# Patient Record
Sex: FEMALE | Race: WHITE | Employment: FULL TIME | URBAN - METROPOLITAN AREA
[De-identification: names, ages, dates, MRNs, and addresses within clinical notes are randomized per-mention and may not be internally consistent; named-entity substitution may affect disease eponyms.]

---

## 2020-03-26 ENCOUNTER — HOSPITAL ENCOUNTER (INPATIENT)
Age: 35
LOS: 1 days | Discharge: HOME | DRG: 392 | End: 2020-03-27
Attending: EMERGENCY MEDICINE | Admitting: INTERNAL MEDICINE
Payer: SELF-PAY

## 2020-03-26 ENCOUNTER — APPOINTMENT (OUTPATIENT)
Dept: GENERAL RADIOLOGY | Age: 35
DRG: 392 | End: 2020-03-26
Attending: EMERGENCY MEDICINE
Payer: SELF-PAY

## 2020-03-26 DIAGNOSIS — E86.0 DEHYDRATION: ICD-10-CM

## 2020-03-26 DIAGNOSIS — D72.829 LEUKOCYTOSIS, UNSPECIFIED TYPE: ICD-10-CM

## 2020-03-26 DIAGNOSIS — N17.9 ACUTE RENAL FAILURE, UNSPECIFIED ACUTE RENAL FAILURE TYPE (HCC): Primary | ICD-10-CM

## 2020-03-26 DIAGNOSIS — R10.84 ABDOMINAL PAIN, GENERALIZED: ICD-10-CM

## 2020-03-26 PROBLEM — E87.1 HYPONATREMIA: Status: ACTIVE | Noted: 2020-03-26

## 2020-03-26 PROBLEM — E87.6 HYPOKALEMIA: Status: ACTIVE | Noted: 2020-03-26

## 2020-03-26 PROBLEM — Z87.59 HISTORY OF POSTPARTUM DEPRESSION: Status: ACTIVE | Noted: 2018-07-26

## 2020-03-26 PROBLEM — D75.839 THROMBOCYTOSIS: Status: ACTIVE | Noted: 2020-03-26

## 2020-03-26 PROBLEM — R11.2 NAUSEA & VOMITING: Status: ACTIVE | Noted: 2020-03-26

## 2020-03-26 PROBLEM — Z86.59 HISTORY OF POSTPARTUM DEPRESSION: Status: ACTIVE | Noted: 2018-07-26

## 2020-03-26 PROBLEM — I10 BENIGN HYPERTENSION: Status: ACTIVE | Noted: 2019-08-12

## 2020-03-26 PROBLEM — E66.9 OBESITY (BMI 30-39.9): Status: ACTIVE | Noted: 2019-09-11

## 2020-03-26 LAB
ANION GAP SERPL CALC-SCNC: 8 MMOL/L (ref 7–16)
BACTERIA URNS QL MICRO: 0 /HPF
BASOPHILS # BLD: 0.1 K/UL (ref 0–0.2)
BASOPHILS NFR BLD: 0 % (ref 0–2)
BUN SERPL-MCNC: 29 MG/DL (ref 6–23)
CALCIUM SERPL-MCNC: 9.1 MG/DL (ref 8.3–10.4)
CASTS URNS QL MICRO: NORMAL /LPF
CHLORIDE SERPL-SCNC: 95 MMOL/L (ref 98–107)
CO2 SERPL-SCNC: 27 MMOL/L (ref 21–32)
CREAT SERPL-MCNC: 1.61 MG/DL (ref 0.6–1)
CRYSTALS URNS QL MICRO: 0 /LPF
DIFFERENTIAL METHOD BLD: ABNORMAL
EOSINOPHIL # BLD: 0.2 K/UL (ref 0–0.8)
EOSINOPHIL NFR BLD: 1 % (ref 0.5–7.8)
EPI CELLS #/AREA URNS HPF: NORMAL /HPF
ERYTHROCYTE [DISTWIDTH] IN BLOOD BY AUTOMATED COUNT: 14.3 % (ref 11.9–14.6)
GLUCOSE SERPL-MCNC: 145 MG/DL (ref 65–100)
HCT VFR BLD AUTO: 41.8 % (ref 35.8–46.3)
HGB BLD-MCNC: 13.6 G/DL (ref 11.7–15.4)
IMM GRANULOCYTES # BLD AUTO: 0.4 K/UL (ref 0–0.5)
IMM GRANULOCYTES NFR BLD AUTO: 1 % (ref 0–5)
LYMPHOCYTES # BLD: 1.1 K/UL (ref 0.5–4.6)
LYMPHOCYTES NFR BLD: 4 % (ref 13–44)
MCH RBC QN AUTO: 26.9 PG (ref 26.1–32.9)
MCHC RBC AUTO-ENTMCNC: 32.5 G/DL (ref 31.4–35)
MCV RBC AUTO: 82.6 FL (ref 79.6–97.8)
MONOCYTES # BLD: 1.7 K/UL (ref 0.1–1.3)
MONOCYTES NFR BLD: 6 % (ref 4–12)
MUCOUS THREADS URNS QL MICRO: 0 /LPF
NEUTS SEG # BLD: 23.8 K/UL (ref 1.7–8.2)
NEUTS SEG NFR BLD: 87 % (ref 43–78)
NRBC # BLD: 0 K/UL (ref 0–0.2)
OTHER OBSERVATIONS,UCOM: NORMAL
PLATELET # BLD AUTO: 607 K/UL (ref 150–450)
PMV BLD AUTO: 9.3 FL (ref 9.4–12.3)
POTASSIUM SERPL-SCNC: 3.1 MMOL/L (ref 3.5–5.1)
RBC # BLD AUTO: 5.06 M/UL (ref 4.05–5.2)
RBC #/AREA URNS HPF: NORMAL /HPF
SODIUM SERPL-SCNC: 130 MMOL/L (ref 136–145)
WBC # BLD AUTO: 27.2 K/UL (ref 4.3–11.1)
WBC URNS QL MICRO: NORMAL /HPF

## 2020-03-26 PROCEDURE — 85025 COMPLETE CBC W/AUTO DIFF WBC: CPT

## 2020-03-26 PROCEDURE — 74011250637 HC RX REV CODE- 250/637: Performed by: EMERGENCY MEDICINE

## 2020-03-26 PROCEDURE — 74011250636 HC RX REV CODE- 250/636: Performed by: INTERNAL MEDICINE

## 2020-03-26 PROCEDURE — 74011250636 HC RX REV CODE- 250/636: Performed by: EMERGENCY MEDICINE

## 2020-03-26 PROCEDURE — 65270000029 HC RM PRIVATE

## 2020-03-26 PROCEDURE — 81015 MICROSCOPIC EXAM OF URINE: CPT

## 2020-03-26 PROCEDURE — 74011250637 HC RX REV CODE- 250/637: Performed by: INTERNAL MEDICINE

## 2020-03-26 PROCEDURE — 74022 RADEX COMPL AQT ABD SERIES: CPT

## 2020-03-26 PROCEDURE — 96375 TX/PRO/DX INJ NEW DRUG ADDON: CPT

## 2020-03-26 PROCEDURE — 96374 THER/PROPH/DIAG INJ IV PUSH: CPT

## 2020-03-26 PROCEDURE — 99284 EMERGENCY DEPT VISIT MOD MDM: CPT

## 2020-03-26 PROCEDURE — 81003 URINALYSIS AUTO W/O SCOPE: CPT

## 2020-03-26 PROCEDURE — 80048 BASIC METABOLIC PNL TOTAL CA: CPT

## 2020-03-26 RX ORDER — CIPROFLOXACIN 2 MG/ML
400 INJECTION, SOLUTION INTRAVENOUS
Status: COMPLETED | OUTPATIENT
Start: 2020-03-26 | End: 2020-03-26

## 2020-03-26 RX ORDER — POTASSIUM CHLORIDE 20 MEQ/1
40 TABLET, EXTENDED RELEASE ORAL
Status: COMPLETED | OUTPATIENT
Start: 2020-03-26 | End: 2020-03-26

## 2020-03-26 RX ORDER — CIPROFLOXACIN 2 MG/ML
400 INJECTION, SOLUTION INTRAVENOUS EVERY 12 HOURS
Status: DISCONTINUED | OUTPATIENT
Start: 2020-03-27 | End: 2020-03-27 | Stop reason: HOSPADM

## 2020-03-26 RX ORDER — ONDANSETRON 2 MG/ML
INJECTION INTRAMUSCULAR; INTRAVENOUS
Status: ACTIVE
Start: 2020-03-26 | End: 2020-03-27

## 2020-03-26 RX ORDER — NALOXONE HYDROCHLORIDE 0.4 MG/ML
0.4 INJECTION, SOLUTION INTRAMUSCULAR; INTRAVENOUS; SUBCUTANEOUS AS NEEDED
Status: DISCONTINUED | OUTPATIENT
Start: 2020-03-26 | End: 2020-03-27 | Stop reason: HOSPADM

## 2020-03-26 RX ORDER — METRONIDAZOLE 500 MG/100ML
500 INJECTION, SOLUTION INTRAVENOUS EVERY 12 HOURS
Status: DISCONTINUED | OUTPATIENT
Start: 2020-03-26 | End: 2020-03-27 | Stop reason: HOSPADM

## 2020-03-26 RX ORDER — ACETAMINOPHEN 325 MG/1
650 TABLET ORAL
Status: DISCONTINUED | OUTPATIENT
Start: 2020-03-26 | End: 2020-03-27 | Stop reason: HOSPADM

## 2020-03-26 RX ORDER — ONDANSETRON 2 MG/ML
4 INJECTION INTRAMUSCULAR; INTRAVENOUS
Status: DISCONTINUED | OUTPATIENT
Start: 2020-03-26 | End: 2020-03-27 | Stop reason: HOSPADM

## 2020-03-26 RX ORDER — SODIUM CHLORIDE 0.9 % (FLUSH) 0.9 %
5-40 SYRINGE (ML) INJECTION AS NEEDED
Status: DISCONTINUED | OUTPATIENT
Start: 2020-03-26 | End: 2020-03-27 | Stop reason: HOSPADM

## 2020-03-26 RX ORDER — SODIUM CHLORIDE 0.9 % (FLUSH) 0.9 %
5-40 SYRINGE (ML) INJECTION EVERY 8 HOURS
Status: DISCONTINUED | OUTPATIENT
Start: 2020-03-26 | End: 2020-03-27 | Stop reason: HOSPADM

## 2020-03-26 RX ORDER — HYDROCODONE BITARTRATE AND ACETAMINOPHEN 5; 325 MG/1; MG/1
1 TABLET ORAL
Status: DISCONTINUED | OUTPATIENT
Start: 2020-03-26 | End: 2020-03-27

## 2020-03-26 RX ORDER — SODIUM CHLORIDE 9 MG/ML
125 INJECTION, SOLUTION INTRAVENOUS CONTINUOUS
Status: DISPENSED | OUTPATIENT
Start: 2020-03-26 | End: 2020-03-26

## 2020-03-26 RX ORDER — POTASSIUM CHLORIDE 14.9 MG/ML
10 INJECTION INTRAVENOUS
Status: DISPENSED | OUTPATIENT
Start: 2020-03-26 | End: 2020-03-26

## 2020-03-26 RX ORDER — NIFEDIPINE 60 MG/1
60 TABLET, EXTENDED RELEASE ORAL DAILY
COMMUNITY

## 2020-03-26 RX ORDER — ENOXAPARIN SODIUM 100 MG/ML
40 INJECTION SUBCUTANEOUS EVERY 24 HOURS
Status: DISCONTINUED | OUTPATIENT
Start: 2020-03-26 | End: 2020-03-27 | Stop reason: HOSPADM

## 2020-03-26 RX ORDER — ONDANSETRON 2 MG/ML
4 INJECTION INTRAMUSCULAR; INTRAVENOUS
Status: COMPLETED | OUTPATIENT
Start: 2020-03-26 | End: 2020-03-26

## 2020-03-26 RX ORDER — SODIUM CHLORIDE 9 MG/ML
150 INJECTION, SOLUTION INTRAVENOUS CONTINUOUS
Status: DISCONTINUED | OUTPATIENT
Start: 2020-03-26 | End: 2020-03-27

## 2020-03-26 RX ORDER — MORPHINE SULFATE 2 MG/ML
1 INJECTION, SOLUTION INTRAMUSCULAR; INTRAVENOUS
Status: DISCONTINUED | OUTPATIENT
Start: 2020-03-26 | End: 2020-03-27

## 2020-03-26 RX ORDER — METRONIDAZOLE 500 MG/100ML
500 INJECTION, SOLUTION INTRAVENOUS
Status: DISCONTINUED | OUTPATIENT
Start: 2020-03-26 | End: 2020-03-26 | Stop reason: SDUPTHER

## 2020-03-26 RX ADMIN — POTASSIUM CHLORIDE: 2 INJECTION, SOLUTION, CONCENTRATE INTRAVENOUS at 18:36

## 2020-03-26 RX ADMIN — ONDANSETRON 4 MG: 2 INJECTION INTRAMUSCULAR; INTRAVENOUS at 13:43

## 2020-03-26 RX ADMIN — ENOXAPARIN SODIUM 40 MG: 40 INJECTION SUBCUTANEOUS at 21:39

## 2020-03-26 RX ADMIN — SODIUM CHLORIDE 1000 ML: 900 INJECTION, SOLUTION INTRAVENOUS at 14:54

## 2020-03-26 RX ADMIN — ONDANSETRON 4 MG: 2 INJECTION INTRAMUSCULAR; INTRAVENOUS at 17:25

## 2020-03-26 RX ADMIN — METRONIDAZOLE 500 MG: 500 INJECTION, SOLUTION INTRAVENOUS at 16:52

## 2020-03-26 RX ADMIN — SODIUM CHLORIDE 125 ML/HR: 900 INJECTION, SOLUTION INTRAVENOUS at 14:54

## 2020-03-26 RX ADMIN — SODIUM CHLORIDE 1000 ML: 900 INJECTION, SOLUTION INTRAVENOUS at 13:43

## 2020-03-26 RX ADMIN — Medication 10 ML: at 22:00

## 2020-03-26 RX ADMIN — CIPROFLOXACIN 400 MG: 2 INJECTION, SOLUTION INTRAVENOUS at 15:43

## 2020-03-26 RX ADMIN — POTASSIUM CHLORIDE 40 MEQ: 1500 TABLET, EXTENDED RELEASE ORAL at 14:54

## 2020-03-26 RX ADMIN — HYDROCODONE BITARTRATE AND ACETAMINOPHEN 1 TABLET: 5; 325 TABLET ORAL at 19:50

## 2020-03-26 NOTE — ASSESSMENT & PLAN NOTE
Nausea vomiting and diarrhea most likely secondary to the acute gastroenteritis but we cannot completely rule out any infectious origin for now we are going to continue the antibiotics follow her clinically

## 2020-03-26 NOTE — ED PROVIDER NOTES
Patient is a 43-year-old female presenting to emerge department day secondary to nausea vomiting and abdominal discomfort. Patient states that this started on Monday night with vomiting and she has had vomiting daily since. The patient says that she is had one episode of loose stools today but has not noticed any blood in her stools or emesis. She had a significant decrease in her oral intake over the last 4 days secondary to the nausea. She has not taken her Procardia since Monday. The patient states that she is now having abdominal discomfort throughout the abdomen from all the episodes of vomiting. She did have a virtual doctor's appointment this morning and was called in some Zofran but then her stomach started hurting worse and she decided to come here to the ER. Upon arrival her blood pressure was markedly low and the patient appeared pale. Past Medical History:   Diagnosis Date    B12 deficiency, history of 11/6/2012    Fatigue 11/6/2012    Hypertension        History reviewed. No pertinent surgical history.       Family History:   Problem Relation Age of Onset    Hypertension Mother     Cancer Mother         colon    Hypertension Maternal Grandmother     Heart Disease Maternal Grandmother     Hypertension Maternal Grandfather     Diabetes Maternal Grandfather     Stroke Maternal Grandfather     Cancer Maternal Grandfather         bladder       Social History     Socioeconomic History    Marital status:      Spouse name: Not on file    Number of children: Not on file    Years of education: Not on file    Highest education level: Not on file   Occupational History    Not on file   Social Needs    Financial resource strain: Not on file    Food insecurity     Worry: Not on file     Inability: Not on file    Transportation needs     Medical: Not on file     Non-medical: Not on file   Tobacco Use    Smoking status: Never Smoker    Smokeless tobacco: Never Used Substance and Sexual Activity    Alcohol use: Yes     Comment: 1-2 x week    Drug use: Not on file    Sexual activity: Not on file   Lifestyle    Physical activity     Days per week: Not on file     Minutes per session: Not on file    Stress: Not on file   Relationships    Social connections     Talks on phone: Not on file     Gets together: Not on file     Attends Restorationism service: Not on file     Active member of club or organization: Not on file     Attends meetings of clubs or organizations: Not on file     Relationship status: Not on file    Intimate partner violence     Fear of current or ex partner: Not on file     Emotionally abused: Not on file     Physically abused: Not on file     Forced sexual activity: Not on file   Other Topics Concern    Not on file   Social History Narrative    Not on file         ALLERGIES: Patient has no known allergies. Review of Systems   Gastrointestinal: Positive for abdominal pain, diarrhea, nausea and vomiting. Genitourinary: Positive for decreased urine volume. All other systems reviewed and are negative. Vitals:    03/26/20 1309 03/26/20 1425 03/26/20 1444 03/26/20 1445   BP: (!) 77/59 140/78 126/72    Pulse:       Resp:       Temp:       SpO2:    96%   Weight:       Height:                Physical Exam     GENERAL:The patient is overweight, and dehydrated. VITAL SIGNS: Heart rate, blood pressure, respiratory rate reviewed as recorded in  nurse's notes  EYES: Pupils reactive. Extraocular motion intact. No conjunctival redness or drainage. EARS: No external masses or lesions. NOSE: No nasal drainage or epistaxis. MOUTH/THROAT: Pharynx clear; airway patent. Decreased moisture in the mucous membranes  NECK: Supple, no meningeal signs. Trachea midline. No masses or thyromegaly. LUNGS: Breath sounds clear and equal bilaterally no accessory muscle use  CARDIOVASCULAR: Regular rate and rhythm  ABDOMEN: Soft with mild diffuse tenderness.  No palpable masses or organomegaly. No  peritoneal signs. No rigidity. EXTREMITIES: No clubbing or cyanosis. No joint swelling. Normal muscle tone. No  restricted range of motion appreciated. NEUROLOGIC: Sensation is grossly intact. Cranial nerve exam reveals face is  symmetrical, tongue is midline speech is clear. SKIN: No rash or petechiae. Good skin turgor palpated. Pale  PSYCHIATRIC: Alert and oriented. Appropriate behavior and judgment. MDM  Number of Diagnoses or Management Options  Diagnosis management comments: Viral infection, gastroenteritis, viral adenitis, pseudomembranous colitis, inflammatory  bowel disease, infectious diarrhea    Abdominal wall pain,     Constipation, fecal impaction, small bowel obstruction, partial small bowel obstruction,  Ileus    UTI, pyelonephritis, renal colic, ureteral stone     Peptic ulcer disease, esophagitis, GERD    Pancreatitis, pancreatic pseudocyst,    hepatic cirrhosis, GI bleed, esophageal varices, poisoning,    gallbladder disease, cholecystitis, diverticulitis, appendicitis, appendicitis with rupture,    ingestion of foreign material         Amount and/or Complexity of Data Reviewed  Clinical lab tests: ordered and reviewed  Tests in the radiology section of CPT®: ordered and reviewed  Tests in the medicine section of CPT®: reviewed and ordered  Review and summarize past medical records: yes  Independent visualization of images, tracings, or specimens: yes      ED Course as of Mar 26 1533   Thu Mar 26, 2020   1443 IMPRESSION:   1. Nonspecific bowel gas pattern.     2. No free intraperitoneal air. XR ABD ACUTE W 1 V CHEST [KH]   1444 WBC(!): 27.2 [KH]   1444 PLATELET(!): 945 [KH]   1444 ABS. NEUTROPHILS(!): 23.8 [KH]   1444 Potassium(!): 3.1 [KH]   1444 BUN(!): 29 [KH]   1445 Creatinine(!): 1.61 [KH]   1520 I talked the patient about the findings in the emergency department.   Secondary to her significant leukocytosis with abdominal pain and acute renal failure she is agreeable with being admitted to the hospital    [KH]   1533 Case discussed with Dr. Suzanna Wolfe who is agreeable with seeing the patient here in the emergency department plan on admitting her to the hospital.    [facility]      ED Course User Index  [KH] Laura Thacker, DO       Procedures

## 2020-03-26 NOTE — H&P
HOSPITALIST H&P/CONSULT  NAME:  Kirsten Portillo   Age:  29 y.o.  :   1985   MRN:   [id number]  PCP: Isamar Castañeda MD  Consulting MD:  Treatment Team: Primary Nurse: Naomy Guidry; Consulting Provider: Jose Fam MD  HPI:   Patient  is a 29 y.o. female presenting with    HPI  42-year-old  female patient with past medical history significant for hypertension presented to the emergency department with chief complaint of intractable nausea vomiting and diarrhea which started approximately 3 days ago. There is that she ate at a fast food restaurant on Monday after that she started having nausea and vomiting which is progressively getting worse, she also states that from yesterday she also having significant diarrhea it concerned her so she came to the emergency department patient denies any subjective fever chills productive cough she denies any contact with the sick individual, she denies any recent antibiotic use ,patient also complaining of feeling dizzy and lightheaded  Subjective   Past Medical History:   Diagnosis Date    B12 deficiency, history of 2012    Fatigue 2012    Hypertension       History reviewed. No pertinent surgical history. Prior to Admission Medications   Prescriptions Last Dose Informant Patient Reported? Taking? NIFEdipine ER (Procardia XL) 60 mg ER tablet   Yes Yes   Sig: Take 60 mg by mouth daily.       Facility-Administered Medications: None     No Known Allergies   Social History     Tobacco Use    Smoking status: Never Smoker    Smokeless tobacco: Never Used   Substance Use Topics    Alcohol use: Yes     Comment: 1-2 x week      Family History   Problem Relation Age of Onset    Hypertension Mother     Cancer Mother         colon    Hypertension Maternal Grandmother     Heart Disease Maternal Grandmother     Hypertension Maternal Grandfather     Diabetes Maternal Grandfather     Stroke Maternal Grandfather     Cancer Maternal Grandfather         bladder          Objective     Objective:     Visit Vitals  /72   Pulse 93   Temp 98.1 °F (36.7 °C)   Resp 18   Ht 5' 9\" (1.753 m)   Wt 99.8 kg (220 lb)   LMP 2020 (Exact Date)   SpO2 96%   BMI 32.49 kg/m²      Temp (24hrs), Av.1 °F (36.7 °C), Min:98.1 °F (36.7 °C), Max:98.1 °F (36.7 °C)    Oxygen Therapy  O2 Sat (%): 96 % (20 1445)  Pulse via Oximetry: 94 beats per minute (20 1445)  O2 Device: Room air (20 1307)  Review of Systems   Constitution: Negative. HENT: Negative. Eyes: Negative. Cardiovascular: Negative. Endocrine: Negative. Hematologic/Lymphatic: Negative. Skin: Negative. Musculoskeletal: Negative. Gastrointestinal: Positive for abdominal pain, diarrhea, nausea and vomiting. Genitourinary: Negative. Neurological: Negative. Psychiatric/Behavioral: Negative. Physical Exam  Constitutional:       Appearance: She is well-developed. HENT:      Head: Normocephalic and atraumatic. Mouth/Throat:      Mouth: Mucous membranes are dry. Eyes:      Conjunctiva/sclera: Conjunctivae normal.      Pupils: Pupils are equal, round, and reactive to light. Neck:      Musculoskeletal: Normal range of motion and neck supple. Cardiovascular:      Rate and Rhythm: Normal rate and regular rhythm. Heart sounds: Normal heart sounds. Pulmonary:      Effort: Pulmonary effort is normal.      Breath sounds: Normal breath sounds. Abdominal:      General: Bowel sounds are normal.      Palpations: Abdomen is soft. Skin:     General: Skin is warm and dry. Neurological:      Mental Status: She is alert.          Data Review:   Recent Results (from the past 24 hour(s))   CBC WITH AUTOMATED DIFF    Collection Time: 20  1:30 PM   Result Value Ref Range    WBC 27.2 (H) 4.3 - 11.1 K/uL    RBC 5.06 4.05 - 5.2 M/uL    HGB 13.6 11.7 - 15.4 g/dL    HCT 41.8 35.8 - 46.3 %    MCV 82.6 79.6 - 97.8 FL    MCH 26.9 26.1 - 32.9 PG MCHC 32.5 31.4 - 35.0 g/dL    RDW 14.3 11.9 - 14.6 %    PLATELET 270 (H) 353 - 450 K/uL    MPV 9.3 (L) 9.4 - 12.3 FL    ABSOLUTE NRBC 0.00 0.0 - 0.2 K/uL    DF AUTOMATED      NEUTROPHILS 87 (H) 43 - 78 %    LYMPHOCYTES 4 (L) 13 - 44 %    MONOCYTES 6 4.0 - 12.0 %    EOSINOPHILS 1 0.5 - 7.8 %    BASOPHILS 0 0.0 - 2.0 %    IMMATURE GRANULOCYTES 1 0.0 - 5.0 %    ABS. NEUTROPHILS 23.8 (H) 1.7 - 8.2 K/UL    ABS. LYMPHOCYTES 1.1 0.5 - 4.6 K/UL    ABS. MONOCYTES 1.7 (H) 0.1 - 1.3 K/UL    ABS. EOSINOPHILS 0.2 0.0 - 0.8 K/UL    ABS. BASOPHILS 0.1 0.0 - 0.2 K/UL    ABS. IMM. GRANS. 0.4 0.0 - 0.5 K/UL   METABOLIC PANEL, BASIC    Collection Time: 03/26/20  1:30 PM   Result Value Ref Range    Sodium 130 (L) 136 - 145 mmol/L    Potassium 3.1 (L) 3.5 - 5.1 mmol/L    Chloride 95 (L) 98 - 107 mmol/L    CO2 27 21 - 32 mmol/L    Anion gap 8 7 - 16 mmol/L    Glucose 145 (H) 65 - 100 mg/dL    BUN 29 (H) 6 - 23 MG/DL    Creatinine 1.61 (H) 0.6 - 1.0 MG/DL    GFR est AA 47 (L) >60 ml/min/1.73m2    GFR est non-AA 39 (L) >60 ml/min/1.73m2    Calcium 9.1 8.3 - 10.4 MG/DL   URINE MICROSCOPIC    Collection Time: 03/26/20  2:46 PM   Result Value Ref Range    WBC 20-50 0 /hpf    RBC 10-20 0 /hpf    Epithelial cells 5-10 0 /hpf    Bacteria 0 0 /hpf    Casts WBC CAST 0 /lpf    Crystals, urine 0 0 /LPF    Mucus 0 0 /lpf    Other observations RESULTS VERIFIED MANUALLY       Imaging /Procedures /Studies       XR Results (maximum last 3): Results from [place] encounter on 03/26/20   XR ABD ACUTE W 1 V CHEST    Impression IMPRESSION:   1. Nonspecific bowel gas pattern. 2.  No free intraperitoneal air. CT Results (maximum last 3): No results found for this or any previous visit. MRI Results (maximum last 3): No results found for this or any previous visit. Nuclear Medicine Results (maximum last 3): No results found for this or any previous visit. US Results (maximum last 3):   No results found for this or any previous visit. Assessment     Assessment and Plan:      Active Hospital Problems    Diagnosis Date Noted    DECLAN (acute kidney injury) ([facility] [address] 03/26/2020    Hypokalemia 03/26/2020    Hyponatremia 03/26/2020    Leukocytosis 03/26/2020    Nausea & vomiting 03/26/2020    Thrombocytosis ([facility] [address] 03/26/2020    Benign hypertension 08/12/2019     Last Assessment & Plan:   BP at goal and continue current treatment      B12 deficiency, history of 11/06/2012         PLAN  ·    Assessment and plan has been discussed with the patient answered all her questions patient code category category 1 full code     Assessment   [facility]) [facility]  Acute kidney injury secondary to the volume loss from intractable nausea vomiting and diarrhea aggressive IV hydration repeat the BMP    Hypokalemia  Secondary to GI loss started on IV potassium supplementation    Hyponatremia  Hypovolemic hyponatremia, IV hydration follow the BMP    Leukocytosis  Most likely stress-induced, repeat the CBC    Nausea & vomiting  Nausea vomiting and diarrhea most likely secondary to the acute gastroenteritis but we cannot completely rule out any infectious origin for now we are going to continue the antibiotics follow her clinically    Benign hypertension  Of hypertension currently hypotensive hold antihypertensive medication        Code Status:     Anticipated discharge:     Signed By: Paul Rangel MD     March 26, 2020

## 2020-03-26 NOTE — ED NOTES
TRANSFER - OUT REPORT:    Verbal report given to radha pisano on Jasmine Davies  being transferred to [facility] for routine progression of care       Report consisted of patients Situation, Background, Assessment and   Recommendations(SBAR). Information from the following report(s) SBAR was reviewed with the receiving nurse. Lines:   Peripheral IV 03/26/20 Right Antecubital (Active)   Site Assessment Clean, dry, & intact 3/26/2020  1:35 PM   Phlebitis Assessment 0 3/26/2020  1:35 PM   Infiltration Assessment 0 3/26/2020  1:35 PM   Dressing Status Clean, dry, & intact 3/26/2020  1:35 PM        Opportunity for questions and clarification was provided.       Patient transported with:   [employer]

## 2020-03-26 NOTE — ASSESSMENT & PLAN NOTE
Acute kidney injury secondary to the volume loss from intractable nausea vomiting and diarrhea aggressive IV hydration repeat the BMP

## 2020-03-26 NOTE — PROGRESS NOTES
Shift assessment complete. Respirations present. Even and unlabored. No s/s of distress. Zero c/o pain at this time. Call light within reach. Encouraged patient to call for assistance. Patient verbalizes understanding. See Doc Flowsheet for assessment details. Patient resting in bed. Skin intact with normal saline with 10KCL infusing at 150cc per hour. Saline well intact to right antecubital. Abdomen soft. Bowel sounds noted. Room air. Lungs clear.

## 2020-03-26 NOTE — PROGRESS NOTES
03/26/20 1754   Dual Skin Pressure Injury Assessment   Dual Skin Pressure Injury Assessment WDL   skin intact.

## 2020-03-27 VITALS
BODY MASS INDEX: 32.58 KG/M2 | RESPIRATION RATE: 17 BRPM | DIASTOLIC BLOOD PRESSURE: 78 MMHG | WEIGHT: 220 LBS | HEART RATE: 98 BPM | TEMPERATURE: 98.2 F | HEIGHT: 69 IN | SYSTOLIC BLOOD PRESSURE: 117 MMHG | OXYGEN SATURATION: 97 %

## 2020-03-27 LAB
ANION GAP SERPL CALC-SCNC: 6 MMOL/L (ref 7–16)
BUN SERPL-MCNC: 13 MG/DL (ref 6–23)
CALCIUM SERPL-MCNC: 7.8 MG/DL (ref 8.3–10.4)
CHLORIDE SERPL-SCNC: 106 MMOL/L (ref 98–107)
CO2 SERPL-SCNC: 25 MMOL/L (ref 21–32)
CREAT SERPL-MCNC: 0.8 MG/DL (ref 0.6–1)
ERYTHROCYTE [DISTWIDTH] IN BLOOD BY AUTOMATED COUNT: 14.3 % (ref 11.9–14.6)
GLUCOSE SERPL-MCNC: 130 MG/DL (ref 65–100)
HCT VFR BLD AUTO: 34.3 % (ref 35.8–46.3)
HGB BLD-MCNC: 10.9 G/DL (ref 11.7–15.4)
MAGNESIUM SERPL-MCNC: 2.5 MG/DL (ref 1.8–2.4)
MCH RBC QN AUTO: 26.4 PG (ref 26.1–32.9)
MCHC RBC AUTO-ENTMCNC: 31.8 G/DL (ref 31.4–35)
MCV RBC AUTO: 83.1 FL (ref 79.6–97.8)
NRBC # BLD: 0 K/UL (ref 0–0.2)
PLATELET # BLD AUTO: 456 K/UL (ref 150–450)
PMV BLD AUTO: 9.2 FL (ref 9.4–12.3)
POTASSIUM SERPL-SCNC: 3.5 MMOL/L (ref 3.5–5.1)
RBC # BLD AUTO: 4.13 M/UL (ref 4.05–5.2)
SODIUM SERPL-SCNC: 137 MMOL/L (ref 136–145)
WBC # BLD AUTO: 14.6 K/UL (ref 4.3–11.1)

## 2020-03-27 PROCEDURE — 74011250636 HC RX REV CODE- 250/636: Performed by: INTERNAL MEDICINE

## 2020-03-27 PROCEDURE — 36415 COLL VENOUS BLD VENIPUNCTURE: CPT

## 2020-03-27 PROCEDURE — 83735 ASSAY OF MAGNESIUM: CPT

## 2020-03-27 PROCEDURE — 85027 COMPLETE CBC AUTOMATED: CPT

## 2020-03-27 PROCEDURE — 80048 BASIC METABOLIC PNL TOTAL CA: CPT

## 2020-03-27 PROCEDURE — 74011250637 HC RX REV CODE- 250/637: Performed by: INTERNAL MEDICINE

## 2020-03-27 PROCEDURE — 74011250637 HC RX REV CODE- 250/637: Performed by: FAMILY MEDICINE

## 2020-03-27 RX ORDER — POTASSIUM CHLORIDE 20 MEQ/1
40 TABLET, EXTENDED RELEASE ORAL
Status: COMPLETED | OUTPATIENT
Start: 2020-03-27 | End: 2020-03-27

## 2020-03-27 RX ORDER — CYCLOBENZAPRINE HCL 5 MG
5 TABLET ORAL
Qty: 21 TAB | Refills: 0 | Status: SHIPPED | OUTPATIENT
Start: 2020-03-27 | End: 2020-04-03

## 2020-03-27 RX ORDER — HYDROCODONE BITARTRATE AND ACETAMINOPHEN 5; 325 MG/1; MG/1
1 TABLET ORAL
Status: DISCONTINUED | OUTPATIENT
Start: 2020-03-27 | End: 2020-03-27 | Stop reason: HOSPADM

## 2020-03-27 RX ORDER — CYCLOBENZAPRINE HCL 10 MG
5 TABLET ORAL ONCE
Status: COMPLETED | OUTPATIENT
Start: 2020-03-27 | End: 2020-03-27

## 2020-03-27 RX ORDER — MORPHINE SULFATE 2 MG/ML
1 INJECTION, SOLUTION INTRAMUSCULAR; INTRAVENOUS
Status: DISCONTINUED | OUTPATIENT
Start: 2020-03-27 | End: 2020-03-27 | Stop reason: HOSPADM

## 2020-03-27 RX ADMIN — CYCLOBENZAPRINE HYDROCHLORIDE 5 MG: 10 TABLET, FILM COATED ORAL at 06:06

## 2020-03-27 RX ADMIN — HYDROCODONE BITARTRATE AND ACETAMINOPHEN 1 TABLET: 5; 325 TABLET ORAL at 04:54

## 2020-03-27 RX ADMIN — CIPROFLOXACIN 400 MG: 2 INJECTION, SOLUTION INTRAVENOUS at 04:55

## 2020-03-27 RX ADMIN — HYDROCODONE BITARTRATE AND ACETAMINOPHEN 1 TABLET: 5; 325 TABLET ORAL at 00:50

## 2020-03-27 RX ADMIN — Medication 10 ML: at 06:00

## 2020-03-27 RX ADMIN — POTASSIUM CHLORIDE: 2 INJECTION, SOLUTION, CONCENTRATE INTRAVENOUS at 00:50

## 2020-03-27 RX ADMIN — POTASSIUM CHLORIDE 40 MEQ: 1500 TABLET, EXTENDED RELEASE ORAL at 06:06

## 2020-03-27 RX ADMIN — METRONIDAZOLE 500 MG: 500 INJECTION, SOLUTION INTRAVENOUS at 05:43

## 2020-03-27 NOTE — PROGRESS NOTES
Discharge instructions given to and reviewed with pt. Pt verbalized understanding of all teaching. Pts  is on his way and pt will call when ready to go. Prescription x1 given to pt.

## 2020-03-27 NOTE — DISCHARGE SUMMARY
Hospitalist Discharge Summary     Admit Date:  3/26/2020  1:12 PM   Name:  Lyle Justice   Age:  29 y.o.  :  1985   MRN:  [id number]   PCP:  Melo Youssef MD  Treatment Team: Attending Provider: Brayan Larios MD; Consulting Provider: Michael Mario MD; Utilization Review: Kirk Cavanaugh RN    Problem List for this Hospitalization:  Hospital Problems as of 3/27/2020 Date Reviewed: 2013          Codes Class Noted - Resolved POA    DECLAN (acute kidney injury) ([facility] ICD-10-CM: N17.9  ICD-9-CM: 584.9  3/26/2020 - Present Yes        Leukocytosis ICD-10-CM: Z52.822  ICD-9-CM: 288.60  3/26/2020 - Present Yes        * (Principal) Nausea & vomiting ICD-10-CM: R11.2  ICD-9-CM: 787.01  3/26/2020 - Present Yes        Hypokalemia ICD-10-CM: E87.6  ICD-9-CM: 276.8  3/26/2020 - Present Yes        Hyponatremia ICD-10-CM: E87.1  ICD-9-CM: 276.1  3/26/2020 - Present Yes        Thrombocytosis ([facility] ICD-10-CM: D47.3  ICD-9-CM: 238.71  3/26/2020 - Present Yes        Benign hypertension ICD-10-CM: I10  ICD-9-CM: 401.1  2019 - Present Yes    Overview Signed 3/26/2020  3:49 PM by Michael Mario MD     Last Assessment & Plan:   BP at goal and continue current treatment             B12 deficiency, history of ICD-10-CM: E53.8  ICD-9-CM: 266.2  2012 - Present Yes                Admission HPI from 3/26/2020:    \"  79-year-old  female patient with past medical history significant for hypertension presented to the emergency department with chief complaint of intractable nausea vomiting and diarrhea which started approximately 3 days ago.   There is that she ate at a fast food restaurant on Monday after that she started having nausea and vomiting which is progressively getting worse, she also states that from yesterday she also having significant diarrhea it concerned her so she came to the emergency department patient denies any subjective fever chills productive cough she denies any contact with the sick individual, she denies any recent antibiotic use ,patient also complaining of feeling dizzy and lightheaded  \"    Hospital Course:  She was admitted to general medical floor for intractable nausea vomiting diarrhea. She was started on empiric antibiotics for a gastrointestinal infection. Her leukocytosis down trended appropriately. She is given intravenous fluids with potassium for her acute kidney injury. Her potassium was replaced. On hospital day 2 she tolerated clear liquid diet and was advanced to GI soft and tolerated well. She was urinating well. Her only complaint was abdominal pain from the vomiting. She is passing gas. She denies any marijuana use. She is hemodynamically stable for discharge to home. She will not be sent on any antibiotics. Follow up instructions below. Plan was discussed with patient. All questions answered. Patient was stable at time of discharge and was instructed to call or return if there are any concerns or recurrence of symptoms. Diagnostic Imaging/Tests:   Xr Abd Acute W 1 V Chest    Result Date: 3/26/2020  Abdomen radiograph series,  3/26/2020 History: Vomiting for 3 days. Diarrhea this morning. Comparison: None Findings: A frontal view of the chest has been included as part of this study. The cardiac silhouette is normal in respect to size. The lungs are expanded without evidence for pneumothorax. No consolidation, or evidence of pleural effusion is seen. Only mild to moderate elevation of the right hemidiaphragm with right basilar scarring versus atelectasis is seen. No free air is seen in the non-dependent abdomen. The visualized bowel gas pattern is non-specific. No dilated loops of small bowel are seen. On the upright view, no  free intraperitoneal air is seen. No significant small bowel air fluid levels are seen. IMPRESSION: 1. Nonspecific bowel gas pattern. 2.  No free intraperitoneal air.        Echocardiogram results:  No results found for this visit on 03/26/20. All Micro Results     Procedure Component Value Units Date/Time    CULTURE, STOOL [[id number]]     Order Status:  Sent Specimen:  Stool     OVA & PARASITES, STOOL [[id number]]     Order Status:  Sent Specimen:  Feces from Stool           Labs: Results:       BMP, Mg, Phos Recent Labs     03/27/20 0517 03/26/20  1330    130*   K 3.5 3.1*    95*   CO2 25 27   AGAP 6* 8   BUN 13 29*   CREA 0.80 1.61*   CA 7.8* 9.1   * 145*   MG 2.5*  --       CBC Recent Labs     03/27/20 0517 03/26/20  1330   WBC 14.6* 27.2*   RBC 4.13 5.06   HGB 10.9* 13.6   HCT 34.3* 41.8   * 607*   GRANS  --  87*   LYMPH  --  4*   EOS  --  1   MONOS  --  6   BASOS  --  0   IG  --  1   ANEU  --  23.8*   ABL  --  1.1   ALISSA  --  0.2   ABM  --  1.7*   ABB  --  0.1   AIG  --  0.4      LFT No results for input(s): SGOT, ALT, TBIL, AP, TP, ALB, GLOB, AGRAT, GPT in the last 72 hours.    Cardiac Testing No results found for: BNPP, BNP, CPK, RCK1, RCK2, RCK3, RCK4, CKMB, CKNDX, CKND1, TROPT, TROIQ   Coagulation Tests No results found for: PTP, INR, APTT, INREXT, INREXT   A1c No results found for: HBA1C, HGBE8, BTF4GEOD, NLB1REVW   Lipid Panel Lab Results   Component Value Date/Time    Cholesterol, total 233 (H) 11/05/2013 08:16 AM    HDL Cholesterol 59 11/05/2013 08:16 AM    LDL, calculated 159 (H) 11/05/2013 08:16 AM    VLDL, calculated 15 11/05/2013 08:16 AM    Triglyceride 75 11/05/2013 08:16 AM    CHOL/HDL Ratio 4.0 11/05/2013 08:16 AM      Thyroid Panel No results found for: T4, T3U, TSH, TSHEXT, TSHEXT     Most Recent UA No results found for: COLOR, APPRN, REFSG, ROSEMARY, PROTU, GLUCU, KETU, BILU, BLDU, UROU, NILA, LEUKU     No Known Allergies  Immunization History   Administered Date(s) Administered    Influenza Vaccine 12/16/2013, 11/13/2015    TB Skin Test (PPD) 11/13/2015       All Labs from Last 24 Hrs:  Recent Results (from the past 24 hour(s))   URINE MICROSCOPIC    Collection Time: 03/26/20  2:46 PM   Result Value Ref Range    WBC 20-50 0 /hpf    RBC 10-20 0 /hpf    Epithelial cells 5-10 0 /hpf    Bacteria 0 0 /hpf    Casts WBC CAST 0 /lpf    Crystals, urine 0 0 /LPF    Mucus 0 0 /lpf    Other observations RESULTS VERIFIED MANUALLY     METABOLIC PANEL, BASIC    Collection Time: 03/27/20  5:17 AM   Result Value Ref Range    Sodium 137 136 - 145 mmol/L    Potassium 3.5 3.5 - 5.1 mmol/L    Chloride 106 98 - 107 mmol/L    CO2 25 21 - 32 mmol/L    Anion gap 6 (L) 7 - 16 mmol/L    Glucose 130 (H) 65 - 100 mg/dL    BUN 13 6 - 23 MG/DL    Creatinine 0.80 0.6 - 1.0 MG/DL    GFR est AA >60 >60 ml/min/1.73m2    GFR est non-AA >60 >60 ml/min/1.73m2    Calcium 7.8 (L) 8.3 - 10.4 MG/DL   CBC W/O DIFF    Collection Time: 03/27/20  5:17 AM   Result Value Ref Range    WBC 14.6 (H) 4.3 - 11.1 K/uL    RBC 4.13 4.05 - 5.2 M/uL    HGB 10.9 (L) 11.7 - 15.4 g/dL    HCT 34.3 (L) 35.8 - 46.3 %    MCV 83.1 79.6 - 97.8 FL    MCH 26.4 26.1 - 32.9 PG    MCHC 31.8 31.4 - 35.0 g/dL    RDW 14.3 11.9 - 14.6 %    PLATELET 065 (H) 714 - 450 K/uL    MPV 9.2 (L) 9.4 - 12.3 FL    ABSOLUTE NRBC 0.00 0.0 - 0.2 K/uL   MAGNESIUM    Collection Time: 03/27/20  5:17 AM   Result Value Ref Range    Magnesium 2.5 (H) 1.8 - 2.4 mg/dL       Discharge Exam:  Patient Vitals for the past 24 hrs:   Temp Pulse Resp BP SpO2   03/27/20 1223 98.2 °F (36.8 °C) 98 17 117/78 97 %   03/27/20 0810 97.4 °F (36.3 °C) 99 18 126/76 94 %   03/27/20 0353 98.6 °F (37 °C) 100 16 116/79 93 %   03/27/20 0049 98.1 °F (36.7 °C) 97 16 114/77 93 %   03/26/20 1937 99.1 °F (37.3 °C) (!) 103 16 99/67 98 %   03/26/20 1819 98.7 °F (37.1 °C) 100 16 111/76 97 %   03/26/20 1713 97.9 °F (36.6 °C) 94 16 115/72 96 %   03/26/20 1653    114/65 98 %   03/26/20 1445     96 %   03/26/20 1444    126/72    03/26/20 1425    140/78      Oxygen Therapy  O2 Sat (%): 97 % (03/27/20 1223)  Pulse via Oximetry: 101 beats per minute (03/26/20 1713)  O2 Device: Room air (03/27/20 0810)    Intake/Output Summary (Last 24 hours) at 3/27/2020 1338  Last data filed at 3/26/2020 1953  Gross per 24 hour   Intake    Output 400 ml   Net -400 ml       General:    Well nourished. Alert. No distress. Not on oxygen. Eyes:   Normal sclera. Extraocular movements intact. ENT:  Normocephalic, atraumatic. Moist mucous membranes  CV:   Regular rate and rhythm. Fixed split S2. No murmur, rub, or gallop. Lungs:  Clear to auscultation bilaterally. No wheezing, rhonchi, or rales. Abdomen: Soft, minimally tender, nondistended. Bowel sounds normal.   Extremities: Warm and dry. No cyanosis or edema. Neurologic: Sensation intact. Skin:     No rashes or jaundice. Psych:  Normal mood and affect. Discharge Info:   Current Discharge Medication List      START taking these medications    Details   cyclobenzaprine (FLEXERIL) 5 mg tablet Take 1 Tab by mouth three (3) times daily as needed for Muscle Spasm(s) for up to 7 days. Qty: 21 Tab, Refills: 0         CONTINUE these medications which have NOT CHANGED    Details   NIFEdipine ER (Procardia XL) 60 mg ER tablet Take 60 mg by mouth daily. Disposition: home    Activity: Activity as tolerated  Diet: DIET GI SOFT No options chosen    Follow-up Appointments   Procedures    FOLLOW UP VISIT Appointment in: Two Weeks Follow up as needed with your primary care physician if your symptoms do not improve. Follow up as needed with your primary care physician if your symptoms do not improve. Standing Status:   Standing     Number of Occurrences:   1     Order Specific Question:   Appointment in     Answer:    Two Weeks         Follow-up Information     Follow up With Specialties Details Why Contact Info    Ildefonso Cooper MD Internal Medicine In 2 weeks As needed if symptoms do not improve [address] [facility]  [phone]            Time spent in patient discharge planning and coordination 35 minutes.     Signed:  Dilshad Candelario MD

## 2020-03-27 NOTE — PROGRESS NOTES
Pt in bed resting this AM, assessment complete. Pt with pain in \"diaphragm area\" pt rates pain 6/7 out of 10. She states she believes the pain is from vomiting earlier this week. Pt alert and oriented. Pt denies needs. Bed L/L, call bell is within reach and side rails are up x 2.

## 2020-03-27 NOTE — PROGRESS NOTES
Shift assessment complete. A&O x 4. Respirations even and unlabored. No signs of distress. No complaints at this time. Call light within reach. Encourage pt to call for assistance. Pt verbalizes understanding. See Doc Flowsheet for assessment details.

## 2020-03-27 NOTE — DISCHARGE INSTRUCTIONS
DISCHARGE SUMMARY from Nurse    PATIENT INSTRUCTIONS:    After general anesthesia or intravenous sedation, for 24 hours or while taking prescription Narcotics:  · Limit your activities  · Do not drive and operate hazardous machinery  · Do not make important personal or business decisions  · Do  not drink alcoholic beverages  · If you have not urinated within 8 hours after discharge, please contact your surgeon on call. Report the following to your surgeon:  · Excessive pain, swelling, redness or odor of or around the surgical area  · Temperature over 100.5  · Nausea and vomiting lasting longer than 4 hours or if unable to take medications  · Any signs of decreased circulation or nerve impairment to extremity: change in color, persistent  numbness, tingling, coldness or increase pain  · Any questions    What to do at Home:  Recommended activity: Activity as tolerated and no driving while on pain medications. If you experience any of the following symptoms please see discharge instructions, please follow up with primary care MD.    *  Please give a list of your current medications to your Primary Care Provider. *  Please update this list whenever your medications are discontinued, doses are      changed, or new medications (including over-the-counter products) are added. *  Please carry medication information at all times in case of emergency situations. These are general instructions for a healthy lifestyle:    No smoking/ No tobacco products/ Avoid exposure to second hand smoke  Surgeon General's Warning:  Quitting smoking now greatly reduces serious risk to your health.     Obesity, smoking, and sedentary lifestyle greatly increases your risk for illness    A healthy diet, regular physical exercise & weight monitoring are important for maintaining a healthy lifestyle    You may be retaining fluid if you have a history of heart failure or if you experience any of the following symptoms:  Weight gain of 3 pounds or more overnight or 5 pounds in a week, increased swelling in our hands or feet or shortness of breath while lying flat in bed. Please call your doctor as soon as you notice any of these symptoms; do not wait until your next office visit. The discharge information has been reviewed with the patient. The patient verbalized understanding. Discharge medications reviewed with the patient and appropriate educational materials and side effects teaching were provided.   ___________________________________________________________________________________________________________________________________

## 2020-03-27 NOTE — PROGRESS NOTES
[facility] in bed resting, no noted distress.

## 2021-01-21 ENCOUNTER — APPOINTMENT (RX ONLY)
Dept: URBAN - METROPOLITAN AREA CLINIC 349 | Facility: CLINIC | Age: 36
Setting detail: DERMATOLOGY
End: 2021-01-21

## 2021-01-21 DIAGNOSIS — D22 MELANOCYTIC NEVI: ICD-10-CM

## 2021-01-21 DIAGNOSIS — Z71.89 OTHER SPECIFIED COUNSELING: ICD-10-CM

## 2021-01-21 DIAGNOSIS — L91.8 OTHER HYPERTROPHIC DISORDERS OF THE SKIN: ICD-10-CM

## 2021-01-21 DIAGNOSIS — L71.0 PERIORAL DERMATITIS: ICD-10-CM

## 2021-01-21 PROBLEM — D22.5 MELANOCYTIC NEVI OF TRUNK: Status: ACTIVE | Noted: 2021-01-21

## 2021-01-21 PROBLEM — D22.62 MELANOCYTIC NEVI OF LEFT UPPER LIMB, INCLUDING SHOULDER: Status: ACTIVE | Noted: 2021-01-21

## 2021-01-21 PROBLEM — D22.61 MELANOCYTIC NEVI OF RIGHT UPPER LIMB, INCLUDING SHOULDER: Status: ACTIVE | Noted: 2021-01-21

## 2021-01-21 PROCEDURE — ? SHAVE REMOVAL

## 2021-01-21 PROCEDURE — A4550 SURGICAL TRAYS: HCPCS

## 2021-01-21 PROCEDURE — ? SKIN TAG REMOVAL

## 2021-01-21 PROCEDURE — 11301 SHAVE SKIN LESION 0.6-1.0 CM: CPT | Mod: 59,76

## 2021-01-21 PROCEDURE — ? BODY PHOTOGRAPHY

## 2021-01-21 PROCEDURE — 11301 SHAVE SKIN LESION 0.6-1.0 CM: CPT | Mod: 59

## 2021-01-21 PROCEDURE — 99204 OFFICE O/P NEW MOD 45 MIN: CPT | Mod: 25

## 2021-01-21 PROCEDURE — ? COUNSELING

## 2021-01-21 PROCEDURE — ? OTHER

## 2021-01-21 PROCEDURE — ? PRESCRIPTION MEDICATION MANAGEMENT

## 2021-01-21 PROCEDURE — ? PATHOLOGY BILLING

## 2021-01-21 PROCEDURE — ? PRESCRIPTION

## 2021-01-21 PROCEDURE — 88305 TISSUE EXAM BY PATHOLOGIST: CPT

## 2021-01-21 PROCEDURE — 11200 RMVL SKIN TAGS UP TO&INC 15: CPT

## 2021-01-21 RX ORDER — DOXYCYCLINE 100 MG/1
CAPSULE ORAL
Qty: 30 | Refills: 0 | Status: ERX | COMMUNITY
Start: 2021-01-21

## 2021-01-21 RX ADMIN — DOXYCYCLINE: 100 CAPSULE ORAL at 00:00

## 2021-01-21 ASSESSMENT — LOCATION ZONE DERM
LOCATION ZONE: FACE
LOCATION ZONE: LIP
LOCATION ZONE: TRUNK
LOCATION ZONE: AXILLAE
LOCATION ZONE: AXILLAE

## 2021-01-21 ASSESSMENT — LOCATION DETAILED DESCRIPTION DERM
LOCATION DETAILED: RIGHT AXILLARY VAULT
LOCATION DETAILED: RIGHT MEDIAL SUPERIOR CHEST
LOCATION DETAILED: LEFT UPPER CUTANEOUS LIP
LOCATION DETAILED: LEFT AXILLARY VAULT
LOCATION DETAILED: LEFT AXILLARY VAULT
LOCATION DETAILED: RIGHT AXILLARY VAULT
LOCATION DETAILED: LEFT INFERIOR CENTRAL MALAR CHEEK
LOCATION DETAILED: RIGHT INFERIOR MEDIAL MALAR CHEEK

## 2021-01-21 ASSESSMENT — LOCATION SIMPLE DESCRIPTION DERM
LOCATION SIMPLE: LEFT CHEEK
LOCATION SIMPLE: LEFT AXILLARY VAULT
LOCATION SIMPLE: LEFT LIP
LOCATION SIMPLE: RIGHT AXILLARY VAULT
LOCATION SIMPLE: CHEST
LOCATION SIMPLE: RIGHT AXILLARY VAULT
LOCATION SIMPLE: RIGHT CHEEK
LOCATION SIMPLE: LEFT AXILLARY VAULT

## 2021-01-21 NOTE — PROCEDURE: PATHOLOGY BILLING
Immunohistochemistry ([id number] and [id number]) billing is not performed here. Please use the Immunohistochemistry Stain Billing plan to accomplish this. Immunohistochemistry ([id number] and [id number]) billing is not performed here. Please use the Immunohistochemistry Stain Billing plan to accomplish this.

## 2021-01-21 NOTE — PROCEDURE: OTHER
Detail Level: Simple
Other (Free Text): Rash seems to be on its way to clearing, will only treat with 4 weeks of Doxycline 100mg once a day than 6 weeks
Note Text (......Xxx Chief Complaint.): This diagnosis correlates with the
Render Risk Assessment In Note?: yes

## 2021-01-21 NOTE — PROCEDURE: BODY PHOTOGRAPHY
Whole Body Statement: The whole body was photographed today.
Detail Level: Generalized
Number Of Photographs (Optional- Will Not Render If 0): 20
Was The Entire Body Photographed (Cannot Bill Unless Entire Body Photographed)?: Please Select Yes or No - If you select Yes you are confirming that you took full body photographs
Consent: Written consent obtained, risks reviewed for whole body photography. Patient understands that photograph costs may not be covered by insurance, and patient is ultimately responsible for payment.

## 2021-01-21 NOTE — PROCEDURE: PRESCRIPTION MEDICATION MANAGEMENT
Render In Strict Bullet Format?: No
Initiate Treatment: Doxycline 100mg once a day
Detail Level: Simple
Plan: Continue natural toothpaste

## 2021-01-21 NOTE — PROCEDURE: SKIN TAG REMOVAL
Medical Necessity Clause: This procedure was medically necessary because the lesions that were treated were:  rubbed by clothing.
Anesthesia Volume In Cc: 0
Detail Level: Detailed
Add Associated Diagnoses If Applicable When Selecting Medical Necessity: Yes
Medical Necessity Information: It is in your best interest to select a reason for this procedure from the list below. All of these items fulfill various CMS LCD requirements except the new and changing color options.
Consent: Written consent obtained and the risks of skin tag removal was reviewed with the patient including but not limited to bleeding, pigmentary change, infection, pain, and remote possibility of scarring.
Anesthesia Type: 1% lidocaine without epinephrine and a 1:10 solution of 8.4% sodium bicarbonate
Hemostasis: Aluminum Chloride

## 2021-01-21 NOTE — PROCEDURE: PATHOLOGY BILLING
Immunohistochemistry ([id number] and [id number]) billing is not performed here. Please use the Immunohistochemistry Stain Billing plan to accomplish this.

## 2021-01-21 NOTE — PROCEDURE: SHAVE REMOVAL
Was A Bandage Applied: Yes
X Size Of Lesion In Cm (Optional): 0
Bill [id number] For Specimen Handling/Conveyance To Laboratory?: no
Size Of Lesion In Cm (Required): 0.6
Detail Level: Detailed
Medical Necessity Clause: This procedure was medically necessary because the lesion that was treated was: irritated and two tone color
Anesthesia Type: 1% lidocaine with epinephrine and a 1:10 solution of 8.4% sodium bicarbonate
Hemostasis: Electrodesiccation
Biopsy Method: Personna blade
Accession #: [id number]
Medical Necessity Information: It is in your best interest to select a reason for this procedure from the list below. All of these items fulfill various CMS LCD requirements except the new and changing color options.
Post-Care Instructions: I reviewed with the patient in detail post-care instructions. Patient is to keep the biopsy site dry overnight, and then apply bacitracin twice daily until healed. Patient may apply hydrogen peroxide soaks to remove any crusting.
Wound Care: Vaseline
Anesthesia Volume In Cc: 0.2
Consent was obtained from the patient. The risks and benefits to therapy were discussed in detail. Specifically, the risks of infection, scarring, bleeding, prolonged wound healing, incomplete removal, allergy to anesthesia, nerve injury and recurrence were addressed. Prior to the procedure, the treatment site was clearly identified and confirmed by the patient. All components of Universal Protocol/PAUSE Rule completed.
Billing Type: Third-Party Bill
Notification Instructions: Patient will be notified of biopsy results. However, patient instructed to call the office if not contacted within 2 weeks.